# Patient Record
Sex: MALE | Race: WHITE | Employment: UNEMPLOYED | URBAN - NONMETROPOLITAN AREA
[De-identification: names, ages, dates, MRNs, and addresses within clinical notes are randomized per-mention and may not be internally consistent; named-entity substitution may affect disease eponyms.]

---

## 2019-08-14 ENCOUNTER — APPOINTMENT (OUTPATIENT)
Dept: CT IMAGING | Age: 34
End: 2019-08-14

## 2019-08-14 ENCOUNTER — APPOINTMENT (OUTPATIENT)
Dept: GENERAL RADIOLOGY | Age: 34
End: 2019-08-14

## 2019-08-14 ENCOUNTER — HOSPITAL ENCOUNTER (EMERGENCY)
Age: 34
Discharge: HOME | End: 2019-08-14

## 2019-08-14 VITALS
BODY MASS INDEX: 18.48 KG/M2 | HEART RATE: 94 BPM | DIASTOLIC BLOOD PRESSURE: 79 MMHG | OXYGEN SATURATION: 99 % | TEMPERATURE: 98 F | SYSTOLIC BLOOD PRESSURE: 131 MMHG | HEIGHT: 66 IN | WEIGHT: 115 LBS | RESPIRATION RATE: 16 BRPM

## 2019-08-14 DIAGNOSIS — S06.0X0A CONCUSSION WITHOUT LOSS OF CONSCIOUSNESS, INITIAL ENCOUNTER: ICD-10-CM

## 2019-08-14 DIAGNOSIS — S63.502A LEFT WRIST SPRAIN, INITIAL ENCOUNTER: ICD-10-CM

## 2019-08-14 DIAGNOSIS — S09.90XA CLOSED HEAD INJURY, INITIAL ENCOUNTER: ICD-10-CM

## 2019-08-14 DIAGNOSIS — S10.93XA CONTUSION OF NECK, INITIAL ENCOUNTER: ICD-10-CM

## 2019-08-14 DIAGNOSIS — S62.636A DISPLACED FRACTURE OF DISTAL PHALANX OF RIGHT LITTLE FINGER, INITIAL ENCOUNTER FOR CLOSED FRACTURE: ICD-10-CM
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
## 2019-08-14 PROCEDURE — 70450 CT HEAD/BRAIN W/O DYE: CPT

## 2019-08-14 PROCEDURE — 73140 X-RAY EXAM OF FINGER(S): CPT

## 2019-08-14 PROCEDURE — 73110 X-RAY EXAM OF WRIST: CPT

## 2019-08-14 PROCEDURE — 70490 CT SOFT TISSUE NECK W/O DYE: CPT

## 2019-08-14 PROCEDURE — 99284 EMERGENCY DEPT VISIT MOD MDM: CPT

## 2019-08-14 ASSESSMENT — PAIN DESCRIPTION - PAIN TYPE: TYPE: ACUTE PAIN

## 2019-08-14 ASSESSMENT — ENCOUNTER SYMPTOMS
COUGH: 0
VOICE CHANGE: 0
VISUAL CHANGE: 0
SHORTNESS OF BREATH: 0
ABDOMINAL PAIN: 0
BLURRED VISION: 0
BACK PAIN: 0
VOMITING: 0
DIFFICULTY BREATHING: 0
DOUBLE VISION: 0

## 2019-08-14 ASSESSMENT — PAIN DESCRIPTION - ORIENTATION: ORIENTATION: RIGHT

## 2019-08-14 ASSESSMENT — PAIN SCALES - GENERAL: PAINLEVEL_OUTOF10: 5

## 2019-08-14 ASSESSMENT — PAIN DESCRIPTION - DESCRIPTORS: DESCRIPTORS: THROBBING

## 2019-08-14 ASSESSMENT — PAIN DESCRIPTION - LOCATION: LOCATION: FINGER (COMMENT WHICH ONE)
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
## 2025-02-05 ENCOUNTER — HOSPITAL ENCOUNTER (INPATIENT)
Age: 40
LOS: 4 days | Discharge: HOME | DRG: 885 | End: 2025-02-11
Attending: STUDENT IN AN ORGANIZED HEALTH CARE EDUCATION/TRAINING PROGRAM | Admitting: PSYCHIATRY & NEUROLOGY
Payer: MEDICAID

## 2025-02-05 DIAGNOSIS — F29 PSYCHOSIS, UNSPECIFIED PSYCHOSIS TYPE (HCC): ICD-10-CM

## 2025-02-05 DIAGNOSIS — Z76.89 ENCOUNTER FOR PSYCHIATRIC ASSESSMENT: Primary | ICD-10-CM

## 2025-02-05 LAB
ALBUMIN SERPL-MCNC: 4.6 G/DL (ref 3.4–5)
ALBUMIN/GLOB SERPL: 1.7 {RATIO} (ref 1.1–2.2)
ALP SERPL-CCNC: 71 U/L (ref 40–129)
ALT SERPL-CCNC: 12 U/L (ref 10–40)
AMPHETAMINES UR QL SCN>1000 NG/ML: ABNORMAL
ANION GAP SERPL CALCULATED.3IONS-SCNC: 10 MMOL/L (ref 3–16)
APAP SERPL-MCNC: <5 UG/ML (ref 10–30)
AST SERPL-CCNC: 16 U/L (ref 15–37)
BARBITURATES UR QL SCN>200 NG/ML: ABNORMAL
BASOPHILS # BLD: 0.1 K/UL (ref 0–0.2)
BASOPHILS NFR BLD: 1.4 %
BENZODIAZ UR QL SCN>200 NG/ML: ABNORMAL
BILIRUB SERPL-MCNC: 0.3 MG/DL (ref 0–1)
BUN SERPL-MCNC: 13 MG/DL (ref 7–20)
CALCIUM SERPL-MCNC: 9.1 MG/DL (ref 8.3–10.6)
CANNABINOIDS UR QL SCN>50 NG/ML: POSITIVE
CHLORIDE SERPL-SCNC: 102 MMOL/L (ref 99–110)
CO2 SERPL-SCNC: 26 MMOL/L (ref 21–32)
COCAINE UR QL SCN: ABNORMAL
CREAT SERPL-MCNC: 0.8 MG/DL (ref 0.9–1.3)
DEPRECATED RDW RBC AUTO: 13.6 % (ref 12.4–15.4)
DRUG SCREEN COMMENT UR-IMP: ABNORMAL
EOSINOPHIL # BLD: 0.1 K/UL (ref 0–0.6)
EOSINOPHIL NFR BLD: 1.9 %
ETHANOLAMINE SERPL-MCNC: NORMAL MG/DL (ref 0–0.08)
FENTANYL SCREEN, URINE: ABNORMAL
FLUAV RNA RESP QL NAA+PROBE: NOT DETECTED
FLUBV RNA RESP QL NAA+PROBE: NOT DETECTED
GFR SERPLBLD CREATININE-BSD FMLA CKD-EPI: >90 ML/MIN/{1.73_M2}
GLUCOSE SERPL-MCNC: 119 MG/DL (ref 70–99)
HCT VFR BLD AUTO: 46 % (ref 40.5–52.5)
HGB BLD-MCNC: 16.2 G/DL (ref 13.5–17.5)
LYMPHOCYTES # BLD: 1 K/UL (ref 1–5.1)
LYMPHOCYTES NFR BLD: 13.1 %
MCH RBC QN AUTO: 31.3 PG (ref 26–34)
MCHC RBC AUTO-ENTMCNC: 35.1 G/DL (ref 31–36)
MCV RBC AUTO: 89 FL (ref 80–100)
METHADONE UR QL SCN>300 NG/ML: ABNORMAL
MONOCYTES # BLD: 0.5 K/UL (ref 0–1.3)
MONOCYTES NFR BLD: 6.7 %
NEUTROPHILS # BLD: 5.6 K/UL (ref 1.7–7.7)
NEUTROPHILS NFR BLD: 76.9 %
OPIATES UR QL SCN>300 NG/ML: ABNORMAL
OXYCODONE UR QL SCN: ABNORMAL
PCP UR QL SCN>25 NG/ML: ABNORMAL
PH UR STRIP: 6 [PH]
PLATELET # BLD AUTO: 345 K/UL (ref 135–450)
PMV BLD AUTO: 6.8 FL (ref 5–10.5)
POTASSIUM SERPL-SCNC: 4 MMOL/L (ref 3.5–5.1)
PROT SERPL-MCNC: 7.3 G/DL (ref 6.4–8.2)
RBC # BLD AUTO: 5.17 M/UL (ref 4.2–5.9)
SALICYLATES SERPL-MCNC: <0.5 MG/DL (ref 15–30)
SARS-COV-2 RNA RESP QL NAA+PROBE: NOT DETECTED
SODIUM SERPL-SCNC: 138 MMOL/L (ref 136–145)
WBC # BLD AUTO: 7.3 K/UL (ref 4–11)

## 2025-02-05 PROCEDURE — 85025 COMPLETE CBC W/AUTO DIFF WBC: CPT

## 2025-02-05 PROCEDURE — 90791 PSYCH DIAGNOSTIC EVALUATION: CPT | Performed by: SOCIAL WORKER

## 2025-02-05 PROCEDURE — 82077 ASSAY SPEC XCP UR&BREATH IA: CPT

## 2025-02-05 PROCEDURE — 99285 EMERGENCY DEPT VISIT HI MDM: CPT

## 2025-02-05 PROCEDURE — 80053 COMPREHEN METABOLIC PANEL: CPT

## 2025-02-05 PROCEDURE — 36415 COLL VENOUS BLD VENIPUNCTURE: CPT

## 2025-02-05 PROCEDURE — 80307 DRUG TEST PRSMV CHEM ANLYZR: CPT

## 2025-02-05 PROCEDURE — 80179 DRUG ASSAY SALICYLATE: CPT

## 2025-02-05 PROCEDURE — 87636 SARSCOV2 & INF A&B AMP PRB: CPT

## 2025-02-05 PROCEDURE — 80143 DRUG ASSAY ACETAMINOPHEN: CPT

## 2025-02-05 ASSESSMENT — PAIN - FUNCTIONAL ASSESSMENT
PAIN_FUNCTIONAL_ASSESSMENT: NONE - DENIES PAIN
PAIN_FUNCTIONAL_ASSESSMENT: NONE - DENIES PAIN

## 2025-02-05 NOTE — ED NOTES
Dinner and beverage provided to the patient. No other needs at this time, sitter at bedside. Will continue to monitor. Patient calm and cooperative.

## 2025-02-05 NOTE — ED NOTES
Patient in bed with eyes closed, respirations easy and even. Will continue to monitor. Sitter at bedside.

## 2025-02-05 NOTE — ED PROVIDER NOTES
[facility] EMERGENCY DEPARTMENT     EMERGENCY DEPARTMENT ENCOUNTER            Pt Name: Gianluca Lockhart   MRN: [id number]   Birthdate 1985   Date of evaluation: 2/5/2025   Provider: Blanche Lion MD   PCP: No primary care provider on file.   Note Started: 1:29 PM EST 2/5/25          CHIEF COMPLAINT     Chief Complaint   Patient presents with    Psychiatric Evaluation     Patient arrives with , patient is here voluntarily, states he is hearing voices. Denies SI/HI. Calm and cooperative during triage.        HISTORY OF PRESENT ILLNESS:   History from : Patient   Limitations to history : None     Gianluca Lockhart is a 39 y.o. male who presents with concerns for need for psychiatric evaluation.  Patient was brought in by police with need for psychiatric evaluation.  He denies suicidal or homicidal ideation.  Does not contribute much to the history but states that he has been told by multiple people who are close to him that they are concerned that he may have multiple personality disorder.  Does not elaborate further.  Was not placed on behavioral health hold.    Nursing Notes were all reviewed and agreed with, or any disagreements were addressed in the HPI.     REVIEW OF SYSTEMS :    Positives and Pertinent negatives as per HPI.      MEDICAL HISTORY   has a past medical history of Concussion and Torn tendon.    No past surgical history on file.   CURRENTMEDICATIONS       Previous Medications    No medications on file      SCREENINGS                           CIWA Assessment  BP: 117/74  Pulse: (!) 112                  PHYSICAL EXAM :  ED Triage Vitals   BP Systolic BP Percentile Diastolic BP Percentile Temp Temp src Pulse Resp SpO2   -- -- -- -- -- -- -- --      Height Weight         -- --            GENERAL APPEARANCE: Awake and alert. Cooperative. No acute distress.  HEAD: Normocephalic. Atraumatic.  EYES: PERRL. EOM's grossly intact.   ENT: Mucous membranes are moist.   NECK: Supple,

## 2025-02-06 PROBLEM — F32.A DEPRESSION, UNSPECIFIED: Status: ACTIVE | Noted: 2025-02-06

## 2025-02-06 PROCEDURE — 6370000000 HC RX 637 (ALT 250 FOR IP): Performed by: STUDENT IN AN ORGANIZED HEALTH CARE EDUCATION/TRAINING PROGRAM

## 2025-02-06 PROCEDURE — G0378 HOSPITAL OBSERVATION PER HR: HCPCS

## 2025-02-06 PROCEDURE — 90791 PSYCH DIAGNOSTIC EVALUATION: CPT | Performed by: SOCIAL WORKER

## 2025-02-06 PROCEDURE — 6370000000 HC RX 637 (ALT 250 FOR IP): Performed by: PSYCHIATRY & NEUROLOGY

## 2025-02-06 RX ORDER — TRAZODONE HYDROCHLORIDE 50 MG/1
50 TABLET, FILM COATED ORAL NIGHTLY PRN
Status: DISCONTINUED | OUTPATIENT
Start: 2025-02-06 | End: 2025-02-11 | Stop reason: HOSPADM

## 2025-02-06 RX ORDER — HYDROXYZINE HYDROCHLORIDE 50 MG/1
50 TABLET, FILM COATED ORAL 3 TIMES DAILY PRN
Status: DISCONTINUED | OUTPATIENT
Start: 2025-02-06 | End: 2025-02-11 | Stop reason: HOSPADM

## 2025-02-06 RX ORDER — POLYETHYLENE GLYCOL 3350 17 G
2 POWDER IN PACKET (EA) ORAL
Status: DISCONTINUED | OUTPATIENT
Start: 2025-02-06 | End: 2025-02-11 | Stop reason: HOSPADM

## 2025-02-06 RX ORDER — ACETAMINOPHEN 325 MG/1
650 TABLET ORAL EVERY 8 HOURS PRN
Status: DISCONTINUED | OUTPATIENT
Start: 2025-02-06 | End: 2025-02-11 | Stop reason: HOSPADM

## 2025-02-06 RX ORDER — OLANZAPINE 5 MG/1
5 TABLET ORAL 3 TIMES DAILY PRN
Status: DISCONTINUED | OUTPATIENT
Start: 2025-02-06 | End: 2025-02-11 | Stop reason: HOSPADM

## 2025-02-06 RX ORDER — ACETAMINOPHEN 500 MG
1000 TABLET ORAL
Status: COMPLETED | OUTPATIENT
Start: 2025-02-06 | End: 2025-02-06

## 2025-02-06 RX ADMIN — TRAZODONE HYDROCHLORIDE 50 MG: 50 TABLET ORAL at 21:44

## 2025-02-06 RX ADMIN — ACETAMINOPHEN 650 MG: 325 TABLET ORAL at 21:44

## 2025-02-06 RX ADMIN — NICOTINE POLACRILEX 2 MG: 2 LOZENGE ORAL at 21:44

## 2025-02-06 RX ADMIN — OLANZAPINE 5 MG: 5 TABLET, FILM COATED ORAL at 18:56

## 2025-02-06 RX ADMIN — ACETAMINOPHEN 1000 MG: 500 TABLET ORAL at 13:06

## 2025-02-06 ASSESSMENT — PAIN DESCRIPTION - ONSET: ONSET: ON-GOING

## 2025-02-06 ASSESSMENT — PATIENT HEALTH QUESTIONNAIRE - PHQ9
SUM OF ALL RESPONSES TO PHQ QUESTIONS 1-9: 6
SUM OF ALL RESPONSES TO PHQ9 QUESTIONS 1 & 2: 6
2. FEELING DOWN, DEPRESSED OR HOPELESS: NEARLY EVERY DAY
SUM OF ALL RESPONSES TO PHQ QUESTIONS 1-9: 6
SUM OF ALL RESPONSES TO PHQ QUESTIONS 1-9: 6
1. LITTLE INTEREST OR PLEASURE IN DOING THINGS: NEARLY EVERY DAY
SUM OF ALL RESPONSES TO PHQ QUESTIONS 1-9: 6

## 2025-02-06 ASSESSMENT — PAIN SCALES - GENERAL
PAINLEVEL_OUTOF10: 0
PAINLEVEL_OUTOF10: 5

## 2025-02-06 ASSESSMENT — PAIN DESCRIPTION - DESCRIPTORS: DESCRIPTORS: ACHING;DISCOMFORT;SORE

## 2025-02-06 ASSESSMENT — SLEEP AND FATIGUE QUESTIONNAIRES
AVERAGE NUMBER OF SLEEP HOURS: 3
SLEEP PATTERN: INSOMNIA;RESTLESSNESS
DO YOU USE A SLEEP AID: YES
DO YOU HAVE DIFFICULTY SLEEPING: YES

## 2025-02-06 ASSESSMENT — LIFESTYLE VARIABLES
HOW OFTEN DO YOU HAVE A DRINK CONTAINING ALCOHOL: NEVER
HOW MANY STANDARD DRINKS CONTAINING ALCOHOL DO YOU HAVE ON A TYPICAL DAY: PATIENT DOES NOT DRINK

## 2025-02-06 ASSESSMENT — PAIN - FUNCTIONAL ASSESSMENT: PAIN_FUNCTIONAL_ASSESSMENT: ACTIVITIES ARE NOT PREVENTED

## 2025-02-06 ASSESSMENT — PAIN DESCRIPTION - LOCATION: LOCATION: BACK

## 2025-02-06 ASSESSMENT — PAIN DESCRIPTION - PAIN TYPE: TYPE: ACUTE PAIN

## 2025-02-06 ASSESSMENT — PAIN DESCRIPTION - FREQUENCY: FREQUENCY: INTERMITTENT

## 2025-02-06 ASSESSMENT — PAIN DESCRIPTION - ORIENTATION: ORIENTATION: MID

## 2025-02-06 NOTE — ED NOTES
Patient resting comfortably, respirations easy, unlabored. Patient in no acute distress. Denies needs at this time. Will continue to monitor.

## 2025-02-06 NOTE — ED NOTES
Transfer Center Handoff for Behavioral Health Transfers      Patient's Current Location: [facility] EMERGENCY DEPARTMENT     Chief Complaint   Patient presents with    Psychiatric Evaluation     Patient arrives with , patient is here voluntarily, states he is hearing voices. Denies SI/HI. Calm and cooperative during triage.       Current or History of Violent Behavior: No    Currently in Restraints Now or During this Encounter: No  (Specify if Agitation or self harm is noted in ED?)  If yes, please describe behaviors requiring restraint:             Medical Clearance Documented and Verified in the Chart: Yes    No Known Allergies     Can Patient Tolerate Lying Flat: Yes    Able to Perform ADLs:  Yes  (Specify if able to ambulate or uses any mobility devices such as cane or walker)  Activity:    Level of Assistance:    Assistive Device:    Miscellaneous Devices:      LABS    CBC:   Lab Results   Component Value Date/Time    WBC 7.3 02/05/2025 02:00 PM    RBC 5.17 02/05/2025 02:00 PM    HGB 16.2 02/05/2025 02:00 PM    HCT 46.0 02/05/2025 02:00 PM    MCV 89.0 02/05/2025 02:00 PM    MCH 31.3 02/05/2025 02:00 PM    MCHC 35.1 02/05/2025 02:00 PM    RDW 13.6 02/05/2025 02:00 PM     02/05/2025 02:00 PM    MPV 6.8 02/05/2025 02:00 PM     CMP:   Lab Results   Component Value Date/Time     02/05/2025 02:00 PM    K 4.0 02/05/2025 02:00 PM     02/05/2025 02:00 PM    CO2 26 02/05/2025 02:00 PM    BUN 13 02/05/2025 02:00 PM    CREATININE 0.8 02/05/2025 02:00 PM    GFRAA >60 09/30/2014 09:10 AM    AGRATIO 1.7 02/05/2025 02:00 PM    LABGLOM >90 02/05/2025 02:00 PM    GLUCOSE 119 02/05/2025 02:00 PM    CALCIUM 9.1 02/05/2025 02:00 PM    BILITOT 0.3 02/05/2025 02:00 PM    ALKPHOS 71 02/05/2025 02:00 PM    AST 16 02/05/2025 02:00 PM    ALT 12 02/05/2025 02:00 PM     Drug Panel: No results found for: \"AMPHETAMUR\", \"BARBITURATUR\", \"COCAINEUR\", \"METHADU\", \"OPIAU\", \"THCUR\", \"LABCOMM\"  UA:  Lab Results

## 2025-02-06 NOTE — PLAN OF CARE
Problem: Behavior  Goal: Pt/Family maintain appropriate behavior and adhere to behavioral management agreement, if implemented  Description: INTERVENTIONS:  1. Assess patient/family's coping skills and  non-compliant behavior (including use of illegal substances)  2. Notify security of behavior or suspected illegal substances which indicate the need for search of the family and/or belongings  3. Encourage verbalization of thoughts and concerns in a socially appropriate manner  4. Utilize positive, consistent limit setting strategies supporting safety of patient, staff and others  5. Encourage participation in the decision making process about the behavioral management agreement  6. If a visitor's behavior poses a threat to safety call refer to organization policy.  7. Initiate consult with , Psychosocial CNS, Spiritual Care as appropriate  Outcome: Progressing     Problem: Psychosis  Goal: Will report no hallucinations or delusions  Description: INTERVENTIONS:  1. Administer medication as  ordered  2. Assist with reality testing to support increasing orientation  3. Assess if patient's hallucinations or delusions are encouraging self harm or harm to others and intervene as appropriate  Outcome: Not Progressing

## 2025-02-06 NOTE — ED PROVIDER NOTES
Patient was evaluated by the psychiatric social worker who recommends admission.  ADT order placed for admission.    Carlie Tolentino MD  02/05/25       Carlie Tolentino MD  02/05/25 [id number]

## 2025-02-06 NOTE — ED NOTES
Handoff report given to nurse assuming primary care of patient, Shavonne ULLOA; all questions and concerns answered; receiving RN acknowledged and had no further questions at this time; receiving RN to assume all care at this time.
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
## 2025-02-07 PROBLEM — F29 PSYCHOSIS (HCC): Status: ACTIVE | Noted: 2025-02-07

## 2025-02-07 PROBLEM — F29 PSYCHOSIS, UNSPECIFIED PSYCHOSIS TYPE (HCC): Status: ACTIVE | Noted: 2025-02-07

## 2025-02-07 PROBLEM — Z76.89 ENCOUNTER FOR PSYCHIATRIC ASSESSMENT: Status: ACTIVE | Noted: 2025-02-07

## 2025-02-07 PROCEDURE — 6370000000 HC RX 637 (ALT 250 FOR IP): Performed by: PSYCHIATRY & NEUROLOGY

## 2025-02-07 PROCEDURE — 1240000000 HC EMOTIONAL WELLNESS R&B

## 2025-02-07 PROCEDURE — 6370000000 HC RX 637 (ALT 250 FOR IP)

## 2025-02-07 PROCEDURE — 99221 1ST HOSP IP/OBS SF/LOW 40: CPT

## 2025-02-07 RX ORDER — QUETIAPINE FUMARATE 50 MG/1
50 TABLET, EXTENDED RELEASE ORAL NIGHTLY
Status: DISCONTINUED | OUTPATIENT
Start: 2025-02-07 | End: 2025-02-11 | Stop reason: HOSPADM

## 2025-02-07 RX ADMIN — OLANZAPINE 5 MG: 5 TABLET, FILM COATED ORAL at 10:01

## 2025-02-07 RX ADMIN — TRAZODONE HYDROCHLORIDE 50 MG: 50 TABLET ORAL at 23:39

## 2025-02-07 RX ADMIN — QUETIAPINE FUMARATE 50 MG: 50 TABLET, EXTENDED RELEASE ORAL at 20:20

## 2025-02-07 RX ADMIN — NICOTINE POLACRILEX 2 MG: 2 LOZENGE ORAL at 23:39

## 2025-02-07 RX ADMIN — NICOTINE POLACRILEX 2 MG: 2 LOZENGE ORAL at 14:12

## 2025-02-07 RX ADMIN — HYDROXYZINE HYDROCHLORIDE 50 MG: 50 TABLET ORAL at 20:22

## 2025-02-07 ASSESSMENT — PATIENT HEALTH QUESTIONNAIRE - PHQ9
6. FEELING BAD ABOUT YOURSELF - OR THAT YOU ARE A FAILURE OR HAVE LET YOURSELF OR YOUR FAMILY DOWN: NEARLY EVERY DAY
SUM OF ALL RESPONSES TO PHQ QUESTIONS 1-9: 26
5. POOR APPETITE OR OVEREATING: NEARLY EVERY DAY
7. TROUBLE CONCENTRATING ON THINGS, SUCH AS READING THE NEWSPAPER OR WATCHING TELEVISION: NEARLY EVERY DAY
2. FEELING DOWN, DEPRESSED OR HOPELESS: NEARLY EVERY DAY
SUM OF ALL RESPONSES TO PHQ9 QUESTIONS 1 & 2: 5
8. MOVING OR SPEAKING SO SLOWLY THAT OTHER PEOPLE COULD HAVE NOTICED. OR THE OPPOSITE, BEING SO FIGETY OR RESTLESS THAT YOU HAVE BEEN MOVING AROUND A LOT MORE THAN USUAL: NEARLY EVERY DAY
10. IF YOU CHECKED OFF ANY PROBLEMS, HOW DIFFICULT HAVE THESE PROBLEMS MADE IT FOR YOU TO DO YOUR WORK, TAKE CARE OF THINGS AT HOME, OR GET ALONG WITH OTHER PEOPLE: EXTREMELY DIFFICULT
4. FEELING TIRED OR HAVING LITTLE ENERGY: NEARLY EVERY DAY
3. TROUBLE FALLING OR STAYING ASLEEP: NEARLY EVERY DAY
1. LITTLE INTEREST OR PLEASURE IN DOING THINGS: MORE THAN HALF THE DAYS
9. THOUGHTS THAT YOU WOULD BE BETTER OFF DEAD, OR OF HURTING YOURSELF: NEARLY EVERY DAY
SUM OF ALL RESPONSES TO PHQ QUESTIONS 1-9: 26
SUM OF ALL RESPONSES TO PHQ QUESTIONS 1-9: 26
SUM OF ALL RESPONSES TO PHQ QUESTIONS 1-9: 23

## 2025-02-07 ASSESSMENT — SLEEP AND FATIGUE QUESTIONNAIRES
DO YOU HAVE DIFFICULTY SLEEPING: YES
AVERAGE NUMBER OF SLEEP HOURS: 3
DO YOU USE A SLEEP AID: NO
SLEEP PATTERN: DIFFICULTY FALLING ASLEEP;DISTURBED/INTERRUPTED SLEEP;RESTLESSNESS

## 2025-02-07 NOTE — PLAN OF CARE
Problem: Depression  Goal: Will be euthymic at discharge  Description: INTERVENTIONS:  1. Administer medication as ordered  2. Provide emotional support via 1:1 interaction with staff  3. Encourage involvement in milieu/groups/activities  4. Monitor for social isolation  2/7/2025 1244 by Neisha Ulrich RN  Outcome: Progressing     Problem: Psychosis  Goal: Will report no hallucinations or delusions  Description: INTERVENTIONS:  1. Administer medication as  ordered  2. Assist with reality testing to support increasing orientation  3. Assess if patient's hallucinations or delusions are encouraging self harm or harm to others and intervene as appropriate  2/7/2025 1244 by Neisha Ulrich RN  Outcome: Progressing     Problem: Behavior  Goal: Pt/Family maintain appropriate behavior and adhere to behavioral management agreement, if implemented  Description: INTERVENTIONS:  1. Assess patient/family's coping skills and  non-compliant behavior (including use of illegal substances)  2. Notify security of behavior or suspected illegal substances which indicate the need for search of the family and/or belongings  3. Encourage verbalization of thoughts and concerns in a socially appropriate manner  4. Utilize positive, consistent limit setting strategies supporting safety of patient, staff and others  5. Encourage participation in the decision making process about the behavioral management agreement  6. If a visitor's behavior poses a threat to safety call refer to organization policy.  7. Initiate consult with , Psychosocial CNS, Spiritual Care as appropriate  2/7/2025 1244 by Neisha Ulrich RN  Outcome: Progressing     Problem: Anxiety  Goal: Will report anxiety at manageable levels  Description: INTERVENTIONS:  1. Administer medication as ordered  2. Teach and rehearse alternative coping skills  3. Provide emotional support with 1:1 interaction with staff  2/7/2025 1244 by Neisha Ulrich

## 2025-02-07 NOTE — H&P
Hospital Medicine History & Physical      PCP: No primary care provider on file.    Date of Admission: 2/5/2025    Date of Service: Pt seen/examined on 02/07/25       Chief Complaint:    Chief Complaint   Patient presents with    Psychiatric Evaluation     Patient arrives with , patient is here voluntarily, states he is hearing voices. Denies SI/HI. Calm and cooperative during triage.         History Of Present Illness:      The patient is a 39 y.o. male who presented to ED for psychiatric evaluation and auditory hallucinations.  Patient was seen and evaluated in the ED by the ED medical provider, patient was medically cleared for admission to [facility]. Patient states that he has had a aquilino on his chest on his left collarbone. He states that the aquilino has gotten smaller over the month, but he states that he has been itching/ irritating it. He denies shortness of breath, cough, congestion, diarrhea, nausea, and vomiting. He states that he sometimes has chest pain when he has panic attacks, but currently denies chest pain.  This note serves as an admission medical H&P.    Tobacco use: endorses daily   ETOH use: denies  Illicit drug use: denies (UDS + for cannabis)    Patient denies any medical complaints.    Past Medical History:        Diagnosis Date    Concussion     Torn tendon     in rt hand/arm, thinks also maybe left hand also       Past Surgical History:    History reviewed. No pertinent surgical history.    Medications Prior to Admission:    Prior to Admission medications    Not on File       Allergies:  Patient has no known allergies.    Social History:  The patient currently lives with his mom.    TOBACCO:   reports that he has been smoking cigarettes. He has never used smokeless tobacco.  ETOH:   reports that he does not currently use alcohol.      Family History:   Positive as follows:    History reviewed. No pertinent family history.    REVIEW OF SYSTEMS:     Constitutional: Negative for
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
## 2025-02-07 NOTE — DISCHARGE INSTRUCTIONS
Name of Provider: [facility] ([facility])  Provider specialty/license: Psychiatry   Date and time of appointment: ***  The type/s of services requested are: Mental Health Assessment & Treatment  Agency name: [facility]  Address: [address] [id number]  Phone Number: [phone]  Special instructions (what to bring to appointment, etc.): [facility] offers walk-in registration for new clients Monday - Friday, 8:30 - 12. We recommend you go as early in the day as possible to decrease your possible wait time. When you go, please bring a photo i.d., proof of residence, proof of household income, and insurance cards.

## 2025-02-07 NOTE — PLAN OF CARE
Gianluca has been withdrawn to his room for most of the evening. He did venture out into the dayroom once and sat at a table away from his peers, watching TV.     Gianluca approached this writer and asked if someone could \"check\" his back. This writer asked for additional details and Gianluca stated that he thought he might have a rash. This writer inspected Gianluca's back and found several moles and pimples, but did not note that any area of the skin appeared red, raised, or otherwise abnormal.     This writer noticed that Gianluca does not make eye contact with staff or peers and often looks down at the ground with his hair covering his face during conversation. There is also a noticeable delay when replying; it appears that he is thought-blocking and possibly preoccupied.     Gianluca did not have any scheduled nightly medications. He requested PRN Trazodone 50 mg PO to help with sleep and PRN Tylenol 650 mg PO to help with acute back pain that he rated a 5/10.     Gianluca denies SI, HI, and AVH. This writer did not note that he was RTIS.       Problem: Depression  Goal: Will be euthymic at discharge  Description: INTERVENTIONS:  1. Administer medication as ordered  2. Provide emotional support via 1:1 interaction with staff  3. Encourage involvement in milieu/groups/activities  4. Monitor for social isolation  Outcome: Progressing     Problem: Psychosis  Goal: Will report no hallucinations or delusions  Description: INTERVENTIONS:  1. Administer medication as  ordered  2. Assist with reality testing to support increasing orientation  3. Assess if patient's hallucinations or delusions are encouraging self harm or harm to others and intervene as appropriate  2/7/2025 0105 by Radha Valles RN  Outcome: Progressing     Problem: Behavior  Goal: Pt/Family maintain appropriate behavior and adhere to behavioral management agreement, if implemented  Description: INTERVENTIONS:  1. Assess patient/family's coping skills and  non-compliant

## 2025-02-07 NOTE — GROUP NOTE
Group Therapy Note    Date: 2/7/2025    Group Start Time: 1100  Group End Time: 1145  Group Topic: Psychoeducation    [facility]    Carly Sosa LISW        Group Therapy Note    Attendees: 8       Patient's Goal:  pt's discussed and learned how to have healthy conversations with using the \"fair fight rules.\" Pt's asked to apply to themselves. Pt's given worksheets on what the \"fair fighting rules\" are.     Notes:  pt came to group late. He was engaged in group discussion and was able to apply to himself.    Status After Intervention:  Improved    Participation Level: Active Listener and Interactive    Participation Quality: Appropriate and Attentive      Speech:  hesitant      Thought Process/Content: Linear      Affective Functioning: Congruent      Mood: depressed      Level of consciousness:  Alert and Attentive      Response to Learning: Able to verbalize current knowledge/experience and Able to verbalize/acknowledge new learning      Endings: None Reported    Modes of Intervention: Education, Support, Socialization, Exploration, and Clarifying      Discipline Responsible: /Counselor      Signature:  JOAQUIN Eddy
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
## 2025-02-08 PROCEDURE — 99233 SBSQ HOSP IP/OBS HIGH 50: CPT

## 2025-02-08 PROCEDURE — 1240000000 HC EMOTIONAL WELLNESS R&B

## 2025-02-08 PROCEDURE — 6370000000 HC RX 637 (ALT 250 FOR IP)

## 2025-02-08 PROCEDURE — 6370000000 HC RX 637 (ALT 250 FOR IP): Performed by: PSYCHIATRY & NEUROLOGY

## 2025-02-08 RX ADMIN — OLANZAPINE 5 MG: 5 TABLET, FILM COATED ORAL at 11:17

## 2025-02-08 RX ADMIN — TRAZODONE HYDROCHLORIDE 50 MG: 50 TABLET ORAL at 22:53

## 2025-02-08 RX ADMIN — HYDROXYZINE HYDROCHLORIDE 50 MG: 50 TABLET ORAL at 08:29

## 2025-02-08 RX ADMIN — QUETIAPINE FUMARATE 50 MG: 50 TABLET, EXTENDED RELEASE ORAL at 20:50

## 2025-02-08 NOTE — BH NOTE
Requested medication for feeling increased anxiety. Received Atarax 50 mg PO to aid in decreasing anxiety.   Gianluca has poor eye contact and noted to have fine tremors of hands. Encouraged to eat morning meal and given a soda to drink.

## 2025-02-08 NOTE — BH NOTE
Patient called back and said he will be dropping off the PSA lab report to the office today.    Reported the Atarax was effective for a short time in reducing increased anxiousness. Requesting additional anti anxiety medication. Received Zyprexa 5 mg PO. Has been able to sit in the milieu and be somewhat social.

## 2025-02-08 NOTE — PLAN OF CARE
Problem: Depression  Goal: Will be euthymic at discharge  Description: INTERVENTIONS:  1. Administer medication as ordered  2. Provide emotional support via 1:1 interaction with staff  3. Encourage involvement in milieu/groups/activities  4. Monitor for social isolation  Outcome: Progressing     Problem: Psychosis  Goal: Will report no hallucinations or delusions  Description: INTERVENTIONS:  1. Administer medication as  ordered  2. Assist with reality testing to support increasing orientation  3. Assess if patient's hallucinations or delusions are encouraging self harm or harm to others and intervene as appropriate  Outcome: Progressing     Problem: Behavior  Goal: Pt/Family maintain appropriate behavior and adhere to behavioral management agreement, if implemented  Description: INTERVENTIONS:  1. Assess patient/family's coping skills and  non-compliant behavior (including use of illegal substances)  2. Notify security of behavior or suspected illegal substances which indicate the need for search of the family and/or belongings  3. Encourage verbalization of thoughts and concerns in a socially appropriate manner  4. Utilize positive, consistent limit setting strategies supporting safety of patient, staff and others  5. Encourage participation in the decision making process about the behavioral management agreement  6. If a visitor's behavior poses a threat to safety call refer to organization policy.  7. Initiate consult with , Psychosocial CNS, Spiritual Care as appropriate  Outcome: Progressing     Problem: Anxiety  Goal: Will report anxiety at manageable levels  Description: INTERVENTIONS:  1. Administer medication as ordered  2. Teach and rehearse alternative coping skills  3. Provide emotional support with 1:1 interaction with staff  Outcome: Progressing  Flowsheets (Taken 2/8/2025 1315)  Will report anxiety at manageable levels:   Administer medication as ordered   Teach and rehearse

## 2025-02-08 NOTE — PLAN OF CARE
Problem: Psychosis  Goal: Will report no hallucinations or delusions  Description: INTERVENTIONS:  1. Administer medication as  ordered  2. Assist with reality testing to support increasing orientation  3. Assess if patient's hallucinations or delusions are encouraging self harm or harm to others and intervene as appropriate  2/7/2025 2039 by Bella Lawrence, RN  Outcome: Not Progressing  2/7/2025 1244 by Neisha Ulrich RN  Outcome: Progressing     Problem: Anxiety  Goal: Will report anxiety at manageable levels  Description: INTERVENTIONS:  1. Administer medication as ordered  2. Teach and rehearse alternative coping skills  3. Provide emotional support with 1:1 interaction with staff  2/7/2025 2039 by Bella Lawrence RN  Outcome: Not Progressing  2/7/2025 1244 by Neisha Ulrich RN  Outcome: Progressing   Gianluca endorses AH and appears to be RTIS. Pt reports the voices are \"noisy\" and states \"they are kicking me when I'm down.\" Patient is medication compliant. PRN Atarax requested/given for anxiety. He denies current SI/HI and VH. He has been mostly withdrawn to his room but he is making his needs known.

## 2025-02-08 NOTE — BH NOTE
Gianluca was able to rest prior to noon meal time then ate meal in the dinning area. Has been able to sit and color with other peers.

## 2025-02-09 PROCEDURE — 1240000000 HC EMOTIONAL WELLNESS R&B

## 2025-02-09 PROCEDURE — 6370000000 HC RX 637 (ALT 250 FOR IP): Performed by: PSYCHIATRY & NEUROLOGY

## 2025-02-09 PROCEDURE — 6370000000 HC RX 637 (ALT 250 FOR IP)

## 2025-02-09 RX ORDER — FLUOXETINE 10 MG/1
10 CAPSULE ORAL DAILY
Status: DISCONTINUED | OUTPATIENT
Start: 2025-02-09 | End: 2025-02-11 | Stop reason: HOSPADM

## 2025-02-09 RX ADMIN — HYDROXYZINE HYDROCHLORIDE 50 MG: 50 TABLET ORAL at 10:07

## 2025-02-09 RX ADMIN — NICOTINE POLACRILEX 2 MG: 2 LOZENGE ORAL at 14:22

## 2025-02-09 RX ADMIN — QUETIAPINE FUMARATE 50 MG: 50 TABLET, EXTENDED RELEASE ORAL at 21:06

## 2025-02-09 RX ADMIN — ACETAMINOPHEN 650 MG: 325 TABLET ORAL at 18:43

## 2025-02-09 RX ADMIN — FLUOXETINE HYDROCHLORIDE 10 MG: 10 CAPSULE ORAL at 11:45

## 2025-02-09 ASSESSMENT — PAIN DESCRIPTION - LOCATION
LOCATION: HEAD
LOCATION: HEAD

## 2025-02-09 ASSESSMENT — PAIN DESCRIPTION - ORIENTATION
ORIENTATION: ANTERIOR
ORIENTATION: ANTERIOR

## 2025-02-09 ASSESSMENT — PAIN - FUNCTIONAL ASSESSMENT: PAIN_FUNCTIONAL_ASSESSMENT: ACTIVITIES ARE NOT PREVENTED

## 2025-02-09 ASSESSMENT — PAIN DESCRIPTION - ONSET
ONSET: ON-GOING
ONSET: ON-GOING

## 2025-02-09 ASSESSMENT — PAIN SCALES - GENERAL
PAINLEVEL_OUTOF10: 2
PAINLEVEL_OUTOF10: 4
PAINLEVEL_OUTOF10: 0

## 2025-02-09 ASSESSMENT — PAIN DESCRIPTION - PAIN TYPE
TYPE: CHRONIC PAIN
TYPE: CHRONIC PAIN

## 2025-02-09 ASSESSMENT — PAIN DESCRIPTION - FREQUENCY
FREQUENCY: INTERMITTENT
FREQUENCY: INTERMITTENT

## 2025-02-09 ASSESSMENT — PAIN DESCRIPTION - DESCRIPTORS
DESCRIPTORS: ACHING
DESCRIPTORS: ACHING

## 2025-02-09 NOTE — BH NOTE
Gianluca requested a 1:1. Discussed the cancer dx his mother has and he cares for her. Verbalized being receptive in starting medication for his depression so he can function better. He does worry about his mother who he says is not taking her medication and he may need to intervene to make a f/u appt for her. Provider made aware of his receptiveness to medication.

## 2025-02-09 NOTE — PLAN OF CARE
Problem: Depression  Goal: Will be euthymic at discharge  Description: INTERVENTIONS:  1. Administer medication as ordered  2. Provide emotional support via 1:1 interaction with staff  3. Encourage involvement in milieu/groups/activities  4. Monitor for social isolation  2/8/2025 2134 by Bella Lawrence RN  Outcome: Progressing     Problem: Psychosis  Goal: Will report no hallucinations or delusions  Description: INTERVENTIONS:  1. Administer medication as  ordered  2. Assist with reality testing to support increasing orientation  3. Assess if patient's hallucinations or delusions are encouraging self harm or harm to others and intervene as appropriate  2/8/2025 2134 by Bella Lawrence RN  Outcome: Progressing     Problem: Behavior  Goal: Pt/Family maintain appropriate behavior and adhere to behavioral management agreement, if implemented  Description: INTERVENTIONS:  1. Assess patient/family's coping skills and  non-compliant behavior (including use of illegal substances)  2. Notify security of behavior or suspected illegal substances which indicate the need for search of the family and/or belongings  3. Encourage verbalization of thoughts and concerns in a socially appropriate manner  4. Utilize positive, consistent limit setting strategies supporting safety of patient, staff and others  5. Encourage participation in the decision making process about the behavioral management agreement  6. If a visitor's behavior poses a threat to safety call refer to organization policy.  7. Initiate consult with , Psychosocial CNS, Spiritual Care as appropriate  2/8/2025 2134 by Bella Lawrence, RN  Outcome: Progressing     Problem: Anxiety  Goal: Will report anxiety at manageable levels  Description: INTERVENTIONS:  1. Administer medication as ordered  2. Teach and rehearse alternative coping skills  3. Provide emotional support with 1:1 interaction with staff  2/8/2025 2134 by Howard

## 2025-02-09 NOTE — BH NOTE
Gianluca was out to the team station requesting \"I need some medicine\". He had just woken and not eaten food. Encouraged to eat some food and this writer would pull an anti anxiety medication. He then sat at the dinning table with his head down and returned to assigned room. Noted sitting on his bed with his head down. Received Atarax 50 mg PO to aid in decreasing c/o anxiety. Morning meal taken into his room and placed on his desk and encouraged him to eat. Inquired if he takes medication in the morning and he replies \"no\". Will monitor for effectiveness of medication.

## 2025-02-10 PROCEDURE — 1240000000 HC EMOTIONAL WELLNESS R&B

## 2025-02-10 PROCEDURE — 99233 SBSQ HOSP IP/OBS HIGH 50: CPT

## 2025-02-10 PROCEDURE — 6370000000 HC RX 637 (ALT 250 FOR IP): Performed by: PSYCHIATRY & NEUROLOGY

## 2025-02-10 PROCEDURE — 6370000000 HC RX 637 (ALT 250 FOR IP)

## 2025-02-10 RX ADMIN — TRAZODONE HYDROCHLORIDE 50 MG: 50 TABLET ORAL at 01:48

## 2025-02-10 RX ADMIN — QUETIAPINE FUMARATE 50 MG: 50 TABLET, EXTENDED RELEASE ORAL at 20:16

## 2025-02-10 RX ADMIN — OLANZAPINE 5 MG: 5 TABLET, FILM COATED ORAL at 08:27

## 2025-02-10 RX ADMIN — FLUOXETINE HYDROCHLORIDE 10 MG: 10 CAPSULE ORAL at 08:25

## 2025-02-10 RX ADMIN — TRAZODONE HYDROCHLORIDE 50 MG: 50 TABLET ORAL at 23:12

## 2025-02-10 RX ADMIN — NICOTINE POLACRILEX 2 MG: 2 LOZENGE ORAL at 14:38

## 2025-02-10 NOTE — PLAN OF CARE
Problem: Depression  Goal: Will be euthymic at discharge  Description: INTERVENTIONS:  1. Administer medication as ordered  2. Provide emotional support via 1:1 interaction with staff  3. Encourage involvement in milieu/groups/activities  4. Monitor for social isolation  Outcome: Progressing     Problem: Psychosis  Goal: Will report no hallucinations or delusions  Description: INTERVENTIONS:  1. Administer medication as  ordered  2. Assist with reality testing to support increasing orientation  3. Assess if patient's hallucinations or delusions are encouraging self harm or harm to others and intervene as appropriate  Outcome: Progressing     Problem: Behavior  Goal: Pt/Family maintain appropriate behavior and adhere to behavioral management agreement, if implemented  Description: INTERVENTIONS:  1. Assess patient/family's coping skills and  non-compliant behavior (including use of illegal substances)  2. Notify security of behavior or suspected illegal substances which indicate the need for search of the family and/or belongings  3. Encourage verbalization of thoughts and concerns in a socially appropriate manner  4. Utilize positive, consistent limit setting strategies supporting safety of patient, staff and others  5. Encourage participation in the decision making process about the behavioral management agreement  6. If a visitor's behavior poses a threat to safety call refer to organization policy.  7. Initiate consult with , Psychosocial CNS, Spiritual Care as appropriate  Outcome: Progressing     Problem: Anxiety  Goal: Will report anxiety at manageable levels  Description: INTERVENTIONS:  1. Administer medication as ordered  2. Teach and rehearse alternative coping skills  3. Provide emotional support with 1:1 interaction with staff  Outcome: Progressing  Flowsheets (Taken 2/9/2025 1244 by Bella Carrillo LPN)  Will report anxiety at manageable levels:   Administer medication as ordered

## 2025-02-10 NOTE — GROUP NOTE
Group Therapy Note    Date: 2/10/2025    Group Start Time: 1000  Group End Time: 1130  Group Topic: Psychoeducation    [facility] [place] OP Dorothy Gao MSW        Group Therapy Note  Clinician engaged the group with the Zones of Regulation, teaching them to be able to identify feelings using the feelings wheel and emoji worksheet. The second half of the group, they learned about the 6 areas of self care and took the self care assessment.   Attendees: 12       Notes:  Patient was fully engaged in the two groups. They were able to identify their feelings using the worksheets. They participated in the 6 areas of self care discussion and took the self care test.     Status After Intervention:  Improved    Participation Level: Active Listener and Interactive    Participation Quality: Appropriate, Attentive, Sharing, and Supportive      Speech:  normal      Thought Process/Content: Logical      Affective Functioning: Congruent      Mood: euthymic      Level of consciousness:  Alert      Response to Learning: Able to verbalize/acknowledge new learning      Endings: None Reported    Modes of Intervention: Education, Support, Socialization, and Exploration      Discipline Responsible: /Counselor      Signature:  MILDRED Bass

## 2025-02-10 NOTE — PLAN OF CARE
Problem: Depression  Goal: Will be euthymic at discharge  Description: INTERVENTIONS:  1. Administer medication as ordered  2. Provide emotional support via 1:1 interaction with staff  3. Encourage involvement in milieu/groups/activities  4. Monitor for social isolation  2/10/2025 1103 by Jenny Honeycutt RN  Outcome: Progressing     Problem: Behavior  Goal: Pt/Family maintain appropriate behavior and adhere to behavioral management agreement, if implemented  Description: INTERVENTIONS:  1. Assess patient/family's coping skills and  non-compliant behavior (including use of illegal substances)  2. Notify security of behavior or suspected illegal substances which indicate the need for search of the family and/or belongings  3. Encourage verbalization of thoughts and concerns in a socially appropriate manner  4. Utilize positive, consistent limit setting strategies supporting safety of patient, staff and others  5. Encourage participation in the decision making process about the behavioral management agreement  6. If a visitor's behavior poses a threat to safety call refer to organization policy.  7. Initiate consult with , Psychosocial CNS, Spiritual Care as appropriate  2/10/2025 1103 by Jenny Honeycutt RN  Outcome: Progressing     Problem: Anxiety  Goal: Will report anxiety at manageable levels  Description: INTERVENTIONS:  1. Administer medication as ordered  2. Teach and rehearse alternative coping skills  3. Provide emotional support with 1:1 interaction with staff  2/10/2025 1103 by Jenny Honeycutt RN  Outcome: Progressing  Flowsheets (Taken 2/10/2025 0946)  Will report anxiety at manageable levels:   Administer medication as ordered   Provide emotional support with 1:1 interaction with staff   Teach and rehearse alternative coping skills     Problem: Pain  Goal: Verbalizes/displays adequate comfort level or baseline comfort level  2/10/2025 1103 by Jenny Honeycutt RN  Outcome:

## 2025-02-10 NOTE — BH NOTE
[facility]  Treatment Team Note  Review Date & Time: 2/10/25  0950    Patient was not in treatment team      Status EXAM:   Mental Status and Behavioral Exam  Normal: No  Level of Assistance: Independent/Self  Facial Expression: Flat, Worried  Affect: Appropriate  Level of Consciousness: Alert  Frequency of Checks: 4 times per hour, close  Mood:Normal: No  Mood: Depressed, Anxious  Motor Activity:Normal: Yes  Motor Activity: Tremors  Eye Contact: Fair  Observed Behavior: Cooperative, Guarded  Sexual Misconduct History: Current - no  Preception: [place] to person, [place] to time, [place] to place, [place] to situation  Attention:Normal: No  Attention: Distractible  Thought Processes: Circumstantial  Thought Content:Normal: No  Thought Content: Preoccupations, Poverty of content  Depression Symptoms: Feelings of worthlessness, Feelings of hopelessess, Impaired concentration  Anxiety Symptoms: Generalized  Betsy Symptoms: No problems reported or observed.  Hallucinations: None  Delusions: Yes  Delusions: Paranoid  Memory:Normal: Yes  Memory: Poor recent  Insight and Judgment: No  Insight and Judgment: Poor judgment, Poor insight      Suicide Risk CSSR-S:  1) Within the past month, have you wished you were dead or wished you could go to sleep and not wake up? : No  2) Have you actually had any thoughts of killing yourself? : No  6) Have you ever done anything, started to do anything, or prepared to do anything to end your life?: No      PLAN/TREATMENT RECOMMENDATIONS UPDATE: Patient will take medication as prescribed, eat 75% of meals, attend groups, participate in milieu activities, participate in treatment team and care planning for discharge and follow up.           Smitha Turner RN

## 2025-02-10 NOTE — BH NOTE
Patient alert and oriented x 3. Patient rates Depression 10/10 and Anxiety 7/10. Patient visible and social watching the Super bowl. Patient denies SI/HI/VH. Patient states, \"The voices are putting me down.\" Patient wouldn't elaborate with writer. Patient took HS medication. No angry outbursts noted. No c/o's voiced @ present.

## 2025-02-11 VITALS
HEIGHT: 66 IN | TEMPERATURE: 98.6 F | SYSTOLIC BLOOD PRESSURE: 118 MMHG | HEART RATE: 89 BPM | RESPIRATION RATE: 16 BRPM | BODY MASS INDEX: 16.78 KG/M2 | WEIGHT: 104.4 LBS | DIASTOLIC BLOOD PRESSURE: 70 MMHG | OXYGEN SATURATION: 99 %

## 2025-02-11 PROCEDURE — 5130000000 HC BRIDGE APPOINTMENT

## 2025-02-11 PROCEDURE — 99239 HOSP IP/OBS DSCHRG MGMT >30: CPT

## 2025-02-11 PROCEDURE — 6370000000 HC RX 637 (ALT 250 FOR IP): Performed by: PSYCHIATRY & NEUROLOGY

## 2025-02-11 PROCEDURE — 6370000000 HC RX 637 (ALT 250 FOR IP)

## 2025-02-11 RX ORDER — FLUOXETINE 10 MG/1
10 CAPSULE ORAL DAILY
Qty: 30 CAPSULE | Refills: 0 | Status: SHIPPED | OUTPATIENT
Start: 2025-02-12

## 2025-02-11 RX ORDER — QUETIAPINE FUMARATE 50 MG/1
50 TABLET, EXTENDED RELEASE ORAL NIGHTLY
Qty: 30 TABLET | Refills: 0 | Status: SHIPPED | OUTPATIENT
Start: 2025-02-11

## 2025-02-11 RX ORDER — TRAZODONE HYDROCHLORIDE 50 MG/1
50 TABLET, FILM COATED ORAL NIGHTLY PRN
Qty: 30 TABLET | Refills: 0 | Status: SHIPPED | OUTPATIENT
Start: 2025-02-11

## 2025-02-11 RX ADMIN — OLANZAPINE 5 MG: 5 TABLET, FILM COATED ORAL at 09:41

## 2025-02-11 RX ADMIN — FLUOXETINE HYDROCHLORIDE 10 MG: 10 CAPSULE ORAL at 08:31

## 2025-02-11 NOTE — GROUP NOTE
Group Therapy Note    Date: 2/11/2025    Group Start Time: 1000  Group End Time: 1145  Group Topic: Psychoeducation    [facility] [place] OP Dorothy Gao MSW        Group Therapy Note  Clinician introduced the group to remembering the things/activities they used to enjoy as children or adults that they no longer engage in. Patients named their strengths and values and named things they used to enjoy while the clinician wrote their comments on the white board. Each member discussed the things they enjoyed as children and activities they no longer do but want to schedule back into their lives. At the end of the group, each member shared the positive of who they are by naming their personal strengths, the things they value and activities they enjoy or want to implement back into their lives.   Attendees: 12       Notes:  Patient was fully engaged in the group. They named their strengths, values and made a goal to get back into doing the things they did when they felt healthier.     Status After Intervention:  Improved    Participation Level: Active Listener and Interactive    Participation Quality: Appropriate, Attentive, Sharing, and Supportive      Speech:  normal      Thought Process/Content: Logical      Affective Functioning: Congruent      Mood: euthymic      Level of consciousness:  Alert      Response to Learning: Able to verbalize current knowledge/experience      Endings: None Reported    Modes of Intervention: Education, Support, Exploration, and Activity      Discipline Responsible: /Counselor      Signature:  MILDRED Bass

## 2025-02-11 NOTE — PROGRESS NOTES
Group Therapy Note    Date: 2/10/2025  Start Time: 20:00  End Time:  21:00  Number of Participants: 7    Type of Group: Recreational  wrap up    Wellness Binder Information  Module Name:  /  Session Number:  /    Patient's Goal:  coping skills    Notes:  continuing to work on goal    Status After Intervention:  Unchanged    Participation Level: Active Listener and Interactive    Participation Quality: Appropriate and Sharing      Speech:  pressured      Thought Process/Content: Logical      Affective Functioning: Blunted      Mood: anxious      Level of consciousness:  Alert and Attentive      Response to Learning: Able to change behavior      Endings: None Reported    Modes of Intervention: Socialization and Problem-solving      Discipline Responsible: Behavorial Health Tech      Signature:  Crispin Escobar    
      Behavioral Services  Medicare Certification Upon Admission    I certify that this patient's inpatient psychiatric hospital admission is medically necessary for:    [x] (1) Treatment which could reasonably be expected to improve this patient's condition,       [x] (2) Or for diagnostic study;     AND     [x](2) The inpatient psychiatric services are provided while the individual is under the care of a physician and are included in the individualized plan of care.    Estimated length of stay/service 2-5 days    Plan for post-hospital care outpatient support    Electronically signed by ONEIDA Harris CNP on 2/7/2025 at 3:29 PM      
   02/11/25 1551   Encounter Summary   Encounter Overview/Reason Spiritual/Emotional Needs   Service Provided For Patient   Referral/Consult From [facility]   Support System Family members   Last Encounter  02/11/25  ( provided active, empathetic listening for support. Shared stories of family and lluvia. Left Gianluca with scripture note and Spiritual Health contact card/PATEL)   Complexity of Encounter Low   Begin Time 1440   End Time  1500   Total Time Calculated 20 min   Spiritual/Emotional needs   Type Spiritual Support   Assessment/Intervention/Outcome   Assessment Coping;Hopeful;Calm   Intervention Active listening;Explored/Affirmed feelings, thoughts, concerns;Sustaining Presence/Ministry of presence;Read/Provided Scripture;Discussed belief system/Mandaen practices/lluvia;Explored Coping Skills/Resources;Life review/Legacy;Empowerment   Outcome Comfort;Connection/Belonging;Coping;Encouraged;Engaged in conversation;Expressed Gratitude;Expressed feelings, needs, and concerns;Optimistic      Janeth Saab EdD, CEM SARKAR ([facility])    Thank you for consulting Spiritual Health    If you would like a 's presence for emotional, spiritual, grief or comfort care,   please dial \"[phone]\" and ask for the  on-call to be paged.    For help with Advanced Care Planning, Power of  for Healthcare or Living Will forms, you may also call us directly:    [phone]) Paddy  [phone]) Kristen  [phone] Outpatient    [facility]    
 [facility] [place]  Admission Note     Admission Type:   Admission Type: Involuntary    Reason for admission:  Reason for Admission: AH, Depression      Addictive Behavior:   Addictive Behavior  In the Past 3 Months, Have You Felt or Has Someone Told You That You Have a Problem With  : None    Medical Problems:   Past Medical History:   Diagnosis Date    Concussion     Torn tendon     in rt hand/arm, thinks also maybe left hand also       Status EXAM:  Mental Status and Behavioral Exam  Normal: No  Level of Assistance: Independent/Self  Facial Expression: Avoids Gaze, Flat  Affect: Constricted  Level of Consciousness: Alert  Frequency of Checks: 4 times per hour, close  Mood:Normal: No  Mood: Depressed, Sad  Motor Activity:Normal: Yes  Eye Contact: Poor  Observed Behavior: Guarded, Cooperative, Preoccupied  Sexual Misconduct History: Current - no  Preception: [place] to person, [place] to time, [place] to place, [place] to situation  Attention:Normal: No  Attention: Unable to concentrate  Thought Processes: Blocking  Thought Content:Normal: No  Thought Content: Preoccupations  Depression Symptoms: Appetite change, Change in energy level, Feelings of helplessness, Feelings of hopelessess, Impaired concentration, Isolative, Loss of interest, Sleep disturbance  Anxiety Symptoms: No problems reported or observed.  Betsy Symptoms: No problems reported or observed.  Hallucinations: Auditory (comment)  Delusions: No  Memory:Normal: Yes  Insight and Judgment: No  Insight and Judgment: Poor judgment, Poor insight    Tobacco Screening:  Practical Counseling, on admission, aquilino X, if applicable and completed (first 3 are required if patient doesn't refuse):            ( ) Recognizing danger situations (included triggers and roadblocks)                    ( ) Coping skills (new ways to manage stress,relaxation techniques, changing routine, distraction)                                                           ( ) Basic 
4 Eyes Skin Assessment     NAME:  Gianluca Lockhart  YOB: 1985  MEDICAL RECORD NUMBER:  [id number]    The patient is being assessed for  Admission    I agree that at least one RN has performed a thorough Head to Toe Skin Assessment on the patient. ALL assessment sites listed below have been assessed.      Areas assessed by both nurses:    Head, Face, Ears, Shoulders, Back, Chest, Arms, Elbows, Hands, Sacrum. Buttock, Coccyx, Ischium, and Legs. Feet and Heels        Does the Patient have a Wound? No noted wound(s)       Samuel Prevention initiated by RN: No  Wound Care Orders initiated by RN: No    Pressure Injury (Stage 3,4, Unstageable, DTI, NWPT, and Complex wounds) if present, place Wound referral order by RN under : No    New Ostomies, if present place, Ostomy referral order under : No     Pt has scattered abrasions on lower extremities.  Raised, brownish/red spot on right clavicle.      Nurse 1 eSignature: Electronically signed by Rick Foreman RN on 2/6/25 at 6:14 PM EST    **SHARE this note so that the co-signing nurse can place an eSignature**    Nurse 2 eSignature: Electronically signed by Keegan Sears RN (Peters) on 2/6/25 at 6:40 PM EST    
[facility] [place]  Discharge Note    Pt discharged with followings belongings:   Dental Appliances: None  Vision - Corrective Lenses: Eyeglasses  Hearing Aid: None  Jewelry: None  Body Piercings Removed: N/A  Clothing: Belt, Footwear, Pants, Shirt, Socks, Undergarments, Gloves  Other Valuables: Money, Wallet ($17 ID and  credit card in wallet)   Valuables returned to patient. Patient educated on aftercare instructions: yes  .Patient verbalize understanding of AVS:  yes.    Status EXAM upon discharge:  Mental Status and Behavioral Exam  Normal: No  Level of Assistance: Independent/Self  Facial Expression: Flat  Affect: Appropriate  Level of Consciousness: Alert  Frequency of Checks: 4 times per hour, close  Mood:Normal: No  Mood: Anxious, Irritable  Motor Activity:Normal: No  Motor Activity: Tremors  Eye Contact: Fair  Observed Behavior: Cooperative, Guarded  Sexual Misconduct History: Current - no  Preception: [place] to person, [place] to time, [place] to place, [place] to situation  Attention:Normal: No  Attention: Distractible  Thought Processes: Circumstantial  Thought Content:Normal: No  Thought Content: Preoccupations  Depression Symptoms: Sleep disturbance  Anxiety Symptoms: Generalized  Betsy Symptoms: No problems reported or observed.  Hallucinations: Auditory (comment)  Delusions: No  Delusions: Paranoid  Memory:Normal: Yes  Memory: Poor recent  Insight and Judgment: No  Insight and Judgment: Poor insight, Poor judgment    Tobacco Screening:  Practical Counseling, on admission, aquilino X, if applicable and completed (first 3 are required if patient doesn't refuse):            ( ) Recognizing danger situations (included triggers and roadblocks)                    ( ) Coping skills (new ways to manage stress,relaxation techniques, changing routine, distraction)                                                           ( ) Basic information about quitting (benefits of quitting, techniques in how to quit, 
[facility]  Treatment Team Note  Review Date & Time: 02/07/2025  5591    Patient was not in treatment team    Status EXAM:   Mental Status and Behavioral Exam  Normal: No  Level of Assistance: Independent/Self  Facial Expression: Avoids Gaze, Flat, Sad, Worried  Affect: Appropriate  Level of Consciousness: Alert  Frequency of Checks: 4 times per hour, close  Mood:Normal: No  Mood: Depressed, Helpless, Worthless, low self-esteem  Motor Activity:Normal: No  Motor Activity: Decreased  Eye Contact: Poor  Observed Behavior: Cooperative, Guarded, Friendly  Sexual Misconduct History: Current - no  Preception: [place] to person, [place] to place, [place] to situation, [place] to time  Attention:Normal: No  Attention: Distractible, Unable to concentrate  Thought Processes: Blocking  Thought Content:Normal: No  Thought Content: Paranoia  Depression Symptoms: Appetite change, Change in energy level, Crying, Feelings of helplessness, Feelings of hopelessess, Feelings of worthlessness, Impaired concentration, Isolative, Increased irritability, Loss of interest, Psychomotor retardation  Anxiety Symptoms: Generalized  Betsy Symptoms: Less need to sleep  Hallucinations: Auditory (comment)  Delusions: Yes  Delusions: Paranoid, Persecutory (\"someone or someones are watching everything that I do\")  Memory:Normal: No  Memory: Other (comment) (Described his memory as \"fragmented\")  Insight and Judgment: No  Insight and Judgment: Poor judgment, Poor insight    Suicide Risk CSSR-S:  1) Within the past month, have you wished you were dead or wished you could go to sleep and not wake up? : No  2) Have you actually had any thoughts of killing yourself? : No  6) Have you ever done anything, started to do anything, or prepared to do anything to end your life?: No    PLAN/TREATMENT RECOMMENDATIONS UPDATE: Patient will take medication as prescribed, eat 75% of meals, attend groups, participate in milieu activities, participate in 
Bridge Appointment completed: Reviewed Discharge Instructions with patient.    Patient verbalizes understanding and agreement with the discharge plan using the teachback method.     Referral for Outpatient Tobacco Cessation Counseling, upon discharge (aquilino X if applicable and completed):    ( )  Hospital staff assisted patient to call Quit Line or faxed referral                                   during hospitalization                  ( )  Recognizing danger situations (included triggers and roadblocks), if not completed on admission                    ( )  Coping skills (new ways to manage stress, exercise, relaxation techniques, changing routine, distraction), if not completed on admission                                                           ( )  Basic information about quitting (benefits of quitting, techniques in how to quit, available resources, if not completed on admission  ( ) [facility]   ( ) Patient refused referral  ( x) Patient refused counseling  ( ) Patient refused smoking cessation medication upon discharge    Vaccinations (aquilino X if applicable and completed):  ( ) Patient states already received influenza vaccine elsewhere  ( ) Patient received influenza vaccine during this hospitalization  ( ) Patient refused influenza vaccine at this time  ( x) Not offered   
Discharge Summary    Admit Date: 2/5/2025   Discharge Date:    2/11/2025    Final Dx: Psychosis (HCC)     At Discharge: 51-60 moderate symptoms   All conditions and active problems were treated while patient was hospitalized.     Condition on DC  Mood and affect are stable and pt is not suicidal     VITALS:  /70   Pulse 89   Temp 98.6 °F (37 °C) (Temporal)   Resp 16   Ht 1.676 m (5' 6\")   Wt 47.4 kg (104 lb 6.4 oz)   SpO2 99%   BMI 16.85 kg/m²     Brief Summary Present Illness    Patient is a 39 y.o. male who presents  to [facility] for a psych eval.  Per tele-psych notes:  \"Pt reports hearing voices \"everyday all day. They are not external. It's all internal \"  He cannot make out what they are saying. Denied visual and tactile hallucinations.   Pt also endorsing some symptoms from traumatic events though his life including depression from ruminating on past experiences, sleep disturbance, loss of motivation and interest and SI. He denies SI at this time. He has trouble concentrating and often feels disconnected from himself.      Recommend inpatient psych for additional eval, medication management and connection to outpatient resources for ongoing management. Patient does not have insurance and will benefit from support to arrange after care. \"     Once on [facility], reports that he called police because he was scared that something \"was manifesting within me and was about to take over\".  Pt states that he has been hearing a voice since he was a child, but he feels that the voice has become different and fears it has new interests.  Pt states he feels he controls his body, but has lost some control over his mind.  Pt has trouble concentrating, racing thoughts.  Pt states the voice feels evil, reminding him of past events.  He also thinks he is hearing a new voice.  Pt reports poor sleep and appetite.  He presents flat/blunted, poor eye contact, guarded.  Pt states he has a small support group online, not 
Patient came to desk requesting medication for agitation. Patient noted to be visibly shaking.  Patient stated, \"Can I have the same medication I had last night before I got my sleeping medications. I worked really well, and right now I just can't get out of my own head.\"  Patient denied having any thoughts of wanting to harm himself.  Patient was educated on Zyprexa that was given in the evening and he was agreeable to take this medication again for agitation.  Zyprexa given per order.  See MAR  
Patient reporting increased anxiety and agitation. Patient requested and given Zyprexa. See MAR.   
Patient reports increased anxiety and agitation. Patient requested and given Zyprexa @3323. Patient sitting in dayroom, social with peers and reports PRN medication effective.   
Pt arrived at 1752 via ED nurse and security.  Pt was wanded by security, no contraband found.  Provided patient a tour of the unit and discussed unit expectations.  Hydration and nutrition offered.  Pt calm and cooperative at this time.    
Pt given PRN PO Zyprexa for auditory hallucinations.   
Reassessment of PRN Zyprexa.  Patient noted to be resting in his room, eyes closed respirations even and easy.  Medication effective.   
100.0 fL Final    MCH 02/05/2025 31.3  26.0 - 34.0 pg Final    MCHC 02/05/2025 35.1  31.0 - 36.0 g/dL Final    RDW 02/05/2025 13.6  12.4 - 15.4 % Final    Platelets 02/05/2025 345  135 - 450 K/uL Final    MPV 02/05/2025 6.8  5.0 - 10.5 fL Final    Neutrophils % 02/05/2025 76.9  % Final    Lymphocytes % 02/05/2025 13.1  % Final    Monocytes % 02/05/2025 6.7  % Final    Eosinophils % 02/05/2025 1.9  % Final    Basophils % 02/05/2025 1.4  % Final    Neutrophils Absolute 02/05/2025 5.6  1.7 - 7.7 K/uL Final    Lymphocytes Absolute 02/05/2025 1.0  1.0 - 5.1 K/uL Final    Monocytes Absolute 02/05/2025 0.5  0.0 - 1.3 K/uL Final    Eosinophils Absolute 02/05/2025 0.1  0.0 - 0.6 K/uL Final    Basophils Absolute 02/05/2025 0.1  0.0 - 0.2 K/uL Final    Sodium 02/05/2025 138  136 - 145 mmol/L Final    Potassium reflex Magnesium 02/05/2025 4.0  3.5 - 5.1 mmol/L Final    Chloride 02/05/2025 102  99 - 110 mmol/L Final    CO2 02/05/2025 26  21 - 32 mmol/L Final    Anion Gap 02/05/2025 10  3 - 16 Final    Glucose 02/05/2025 119 (H)  70 - 99 mg/dL Final    BUN 02/05/2025 13  7 - 20 mg/dL Final    Creatinine 02/05/2025 0.8 (L)  0.9 - 1.3 mg/dL Final    Est, Glom Filt Rate 02/05/2025 >90  >60 Final    Comment: Pediatric calculator link  https://www.kidney.org/professionals/kdoqi/gfr_calculatorped  Effective Oct 3, 2022  These results are not intended for use in patients  <18 years of age.  eGFR results are calculated without  a race factor using the 2021 CKD-EPI equation.  Careful  clinical correlation is recommended, particularly when  comparing to results calculated using previous equations.  The CKD-EPI equation is less accurate in patients with  extremes of muscle mass, extra-renal metabolism of  creatinine, excessive creatinine ingestion, or following  therapy that affects renal tubular secretion.      Calcium 02/05/2025 9.1  8.3 - 10.6 mg/dL Final    Total Protein 02/05/2025 7.3  6.4 - 8.2 g/dL Final    Albumin 02/05/2025 4.6  
Albumin/Globulin Ratio 02/05/2025 1.7  1.1 - 2.2 Final    Total Bilirubin 02/05/2025 0.3  0.0 - 1.0 mg/dL Final    Alkaline Phosphatase 02/05/2025 71  40 - 129 U/L Final    ALT 02/05/2025 12  10 - 40 U/L Final    AST 02/05/2025 16  15 - 37 U/L Final    Ethanol Lvl 02/05/2025 None Detected  mg/dL Final    Comment:    None Detected  Conversion factor:  100 mg/dl = .100 g/dl  For Medical Purposes Only      Acetaminophen Level 02/05/2025 <5 (L)  10 - 30 ug/mL Final    Comment: Therapeutic Range: 10.0-30.0 ug/mL  Toxic: >=150 ug/mL      Salicylate Lvl 02/05/2025 <0.5 (L)  15.0 - 30.0 mg/dL Final    Comment: Therapeutic Range: 15.0-30.0 mg/dL  Toxic: >30.0 mg/dL      Amphetamine Screen, Ur 02/05/2025 Neg  Negative <1000ng/mL Final    Barbiturate Screen, Ur 02/05/2025 Neg  Negative <200 ng/mL Final    Benzodiazepine Screen, Urine 02/05/2025 Neg  Negative <200 ng/mL Final    Cannabinoid Scrn, Ur 02/05/2025 POSITIVE (A)  Negative <50 ng/mL Final    Cocaine Metabolite Screen, Urine 02/05/2025 Neg  Negative <300 ng/mL Final    Opiate Screen, Urine 02/05/2025 Neg  Negative <300 ng/mL Final    Comment: \"Therapeutic levels of pain medication, especially oxycontin and synthetic  opioids, may not be detected by this Methodology. Pain management screen  panel  Drug panel-PM-Hi Res Ur, Interp (PAIN) should be considered for drug  monitoring \".      Phencyclidine (PCP), Screen, Urine 02/05/2025 Neg  Negative <25 ng/mL Final    Methadone Screen, Urine 02/05/2025 Neg  Negative <300 ng/mL Final    Oxycodone Urine 02/05/2025 Neg  Negative <100 ng/ml Final    FENTANYL SCREEN, URINE 02/05/2025 Neg  Negative <5 ng/mL Final    pH, Urine 02/05/2025 6.0   Final    Comment: Urine pH less than 5.0 or greater than 8.0 may indicate sample adulteration.  Another sample should be collected if clinically  indicated.      Drug Screen Comment: 02/05/2025 see below   Final    Comment: This method is a screening test to detect only these drug  classes as

## 2025-02-11 NOTE — PLAN OF CARE
Problem: Depression  Goal: Will be euthymic at discharge  Description: INTERVENTIONS:  1. Administer medication as ordered  2. Provide emotional support via 1:1 interaction with staff  3. Encourage involvement in milieu/groups/activities  4. Monitor for social isolation  2/10/2025 1956 by Nidhi Hatfield RN  Outcome: Progressing  2/10/2025 1103 by Jenny Honeycutt RN  Outcome: Progressing     Problem: Psychosis  Goal: Will report no hallucinations or delusions  Description: INTERVENTIONS:  1. Administer medication as  ordered  2. Assist with reality testing to support increasing orientation  3. Assess if patient's hallucinations or delusions are encouraging self harm or harm to others and intervene as appropriate  Outcome: Progressing     Problem: Behavior  Goal: Pt/Family maintain appropriate behavior and adhere to behavioral management agreement, if implemented  Description: INTERVENTIONS:  1. Assess patient/family's coping skills and  non-compliant behavior (including use of illegal substances)  2. Notify security of behavior or suspected illegal substances which indicate the need for search of the family and/or belongings  3. Encourage verbalization of thoughts and concerns in a socially appropriate manner  4. Utilize positive, consistent limit setting strategies supporting safety of patient, staff and others  5. Encourage participation in the decision making process about the behavioral management agreement  6. If a visitor's behavior poses a threat to safety call refer to organization policy.  7. Initiate consult with , Psychosocial CNS, Spiritual Care as appropriate  2/10/2025 1956 by Nidhi Hatfield RN  Outcome: Progressing  2/10/2025 1103 by Jenny Honeycutt RN  Outcome: Progressing     Problem: Anxiety  Goal: Will report anxiety at manageable levels  Description: INTERVENTIONS:  1. Administer medication as ordered  2. Teach and rehearse alternative coping skills  3. Provide

## 2025-02-11 NOTE — BH NOTE
Patient alert and oriented x 3. Patient rates Depression 3/10 and Anxiety 5/10. Patient denies SI/HI/V/H. Patient states, \"The voices are telling me to go home.\" No angry outbursts noted.

## 2025-02-11 NOTE — PLAN OF CARE
Problem: Depression  Goal: Will be euthymic at discharge  Description: INTERVENTIONS:  1. Administer medication as ordered  2. Provide emotional support via 1:1 interaction with staff  3. Encourage involvement in milieu/groups/activities  4. Monitor for social isolation  Outcome: Completed     Problem: Psychosis  Goal: Will report no hallucinations or delusions  Description: INTERVENTIONS:  1. Administer medication as  ordered  2. Assist with reality testing to support increasing orientation  3. Assess if patient's hallucinations or delusions are encouraging self harm or harm to others and intervene as appropriate  Outcome: Completed     Problem: Behavior  Goal: Pt/Family maintain appropriate behavior and adhere to behavioral management agreement, if implemented  Description: INTERVENTIONS:  1. Assess patient/family's coping skills and  non-compliant behavior (including use of illegal substances)  2. Notify security of behavior or suspected illegal substances which indicate the need for search of the family and/or belongings  3. Encourage verbalization of thoughts and concerns in a socially appropriate manner  4. Utilize positive, consistent limit setting strategies supporting safety of patient, staff and others  5. Encourage participation in the decision making process about the behavioral management agreement  6. If a visitor's behavior poses a threat to safety call refer to organization policy.  7. Initiate consult with , Psychosocial CNS, Spiritual Care as appropriate  Outcome: Completed     Problem: Anxiety  Goal: Will report anxiety at manageable levels  Description: INTERVENTIONS:  1. Administer medication as ordered  2. Teach and rehearse alternative coping skills  3. Provide emotional support with 1:1 interaction with staff  Outcome: Completed     Problem: Sleep Disturbance  Goal: Will exhibit normal sleeping pattern  Description: INTERVENTIONS:  1. Administer medication as ordered  2.

## 2025-02-11 NOTE — TRANSITION OF CARE
Behavioral Health Transition Record    Patient Name: Gianluca Lockhart  YOB: 1985   Medical Record Number: [id number]  Date of Admission: 2/5/2025  1:27 PM   Date of Discharge: 2/11/2025    Attending Provider: Ovidio Campbell MD   Discharging Provider: Donna Nash APRN - CNP    To contact this individual call [phone]  and ask the  to page.  If unavailable, ask to be transferred to Behavioral Health Provider on call.  A Behavioral Health Provider will be available on call 24/7 and during holidays.    Primary Care Provider: No primary care provider on file.    No Known Allergies    Reason for Admission: Patient is a 39 y.o. male who presents  to [facility] for a psych eval.  Per tele-psych notes:  \"Pt reports hearing voices \"everyday all day. They are not external. It's all internal \"  He cannot make out what they are saying. Denied visual and tactile hallucinations.   Pt also endorsing some symptoms from traumatic events though his life including depression from ruminating on past experiences, sleep disturbance, loss of motivation and interest and SI. He denies SI at this time. He has trouble concentrating and often feels disconnected from himself.       Once on [facility], reports that he called police because he was scared that something \"was manifesting within me and was about to take over\".  Pt states that he has been hearing a voice since he was a child, but he feels that the voice has become different and fears it has new interests.  Pt states he feels he controls his body, but has lost some control over his mind.  Pt has trouble concentrating, racing thoughts.  Pt states the voice feels evil, reminding him of past events.  He also thinks he is hearing a new voice.  Pt reports poor sleep and appetite.  He presents flat/blunted, poor eye contact, guarded.  Pt states he has a small support group online, not working, seems to isolate.     Admission Diagnosis: Psychosis, unspecified

## 2025-02-12 ENCOUNTER — FOLLOWUP TELEPHONE ENCOUNTER (OUTPATIENT)
Dept: PSYCHIATRY | Age: 40
End: 2025-02-12

## 2025-02-12 NOTE — DISCHARGE SUMMARY
Discharge Summary    Admit Date: 2/5/2025   Discharge Date:  2/11/2025 2/12/2025    Final Dx: Psychosis (HCC)     At Discharge: 61-70 mild symptoms   All conditions and active problems were treated while patient was hospitalized.     Condition on DC  Mood and affect are stable and pt is not suicidal     VITALS:  /70   Pulse 89   Temp 98.6 °F (37 °C) (Temporal)   Resp 16   Ht 1.676 m (5' 6\")   Wt 47.4 kg (104 lb 6.4 oz)   SpO2 99%   BMI 16.85 kg/m²     Brief Summary Present Illness    Patient is a 39 y.o. male who presents  to [facility] for a psych eval.  Per tele-psych notes:  \"Pt reports hearing voices \"everyday all day. They are not external. It's all internal \"  He cannot make out what they are saying. Denied visual and tactile hallucinations.   Pt also endorsing some symptoms from traumatic events though his life including depression from ruminating on past experiences, sleep disturbance, loss of motivation and interest and SI. He denies SI at this time. He has trouble concentrating and often feels disconnected from himself.      Recommend inpatient psych for additional eval, medication management and connection to outpatient resources for ongoing management. Patient does not have insurance and will benefit from support to arrange after care. \"     Once on [facility], reports that he called police because he was scared that something \"was manifesting within me and was about to take over\".  Pt states that he has been hearing a voice since he was a child, but he feels that the voice has become different and fears it has new interests.  Pt states he feels he controls his body, but has lost some control over his mind.  Pt has trouble concentrating, racing thoughts.  Pt states the voice feels evil, reminding him of past events.  He also thinks he is hearing a new voice.  Pt reports poor sleep and appetite.  He presents flat/blunted, poor eye contact, guarded.  Pt states he has a small support group

## 2025-02-14 ENCOUNTER — FOLLOWUP TELEPHONE ENCOUNTER (OUTPATIENT)
Dept: PSYCHIATRY | Age: 40
End: 2025-02-14

## 2025-02-18 ENCOUNTER — FOLLOWUP TELEPHONE ENCOUNTER (OUTPATIENT)
Dept: PSYCHIATRY | Age: 40
End: 2025-02-18

## 2025-02-18 ENCOUNTER — TELEPHONE (OUTPATIENT)
Dept: PSYCHIATRY | Age: 40
End: 2025-02-18

## 2025-02-18 NOTE — TELEPHONE ENCOUNTER
LPC spoke with patient regarding discharge instructions. Patient reports that he felt \"rushed out\" and did not understand his instructions. He reported that the discharge nurse told him a cab would pick him up for his appointment on 2/13/25. LPC explained the appointments are walk-in hours for [facility] and he would need to navigate transportation. LPC shared that typically on the back of the insurance card, they can call a number to arrange transport but the hospital does not help with that once a patient is discharged. Patient verbalized understanding.    no

## 2025-04-15 NOTE — TELEPHONE ENCOUNTER
Pt requesting refills on 10 mg fluoxetine, quetiapine XR 50 mg nightly, and trazodone 50 mg nightly PRN. Pt states he has been taking care of his mom and has not seen an OP provider yet. Donna Nash out of office this week. Dr. Campbell approved. Writer called in to [facility] at [phone] per pt request. Writer gave pt follow up info for [facility].

## 2025-05-19 ENCOUNTER — FOLLOWUP TELEPHONE ENCOUNTER (OUTPATIENT)
Dept: PSYCHIATRY | Age: 40
End: 2025-05-19

## 2025-05-19 NOTE — TELEPHONE ENCOUNTER
Pt requesting refills on 10 mg fluoxetine, quetiapine XR 50 mg nightly, and trazodone 50 mg nightly PRN. Pt states he had been taking care of his mom who had cancer and has not seen an OP provider yet. Donna Nash approved #30 for each. Writer called in to [facility] at [phone] per pt request. Writer gave pt follow up info for [facility] and Alexandre serrano assistance.